# Patient Record
Sex: FEMALE | URBAN - METROPOLITAN AREA
[De-identification: names, ages, dates, MRNs, and addresses within clinical notes are randomized per-mention and may not be internally consistent; named-entity substitution may affect disease eponyms.]

---

## 2022-03-04 ENCOUNTER — APPOINTMENT (RX ONLY)
Dept: URBAN - METROPOLITAN AREA TELEMEDICINE 2 | Facility: TELEMEDICINE | Age: 37
Setting detail: DERMATOLOGY
End: 2022-03-04

## 2022-03-04 DIAGNOSIS — Z41.9 ENCOUNTER FOR PROCEDURE FOR PURPOSES OTHER THAN REMEDYING HEALTH STATE, UNSPECIFIED: ICD-10-CM

## 2022-03-04 PROCEDURE — ? OTHER

## 2022-03-04 PROCEDURE — ? COSMETIC CONSULTATION: BOTULINUM TOXIN

## 2022-03-04 PROCEDURE — ? BOTOX

## 2022-03-04 NOTE — PROCEDURE: BOTOX
Show Masseter Units: Yes
Mentalis Units: 0
Show Right And Left Periorbital Units: No
Post-Care Instructions: Patient instructed to not lie down for 4 hours and limit physical activity for 24 hours. Patient instructed not to travel by airplane for 48 hours.
Additional Area 2 Location: Lower lids
Detail Level: Detailed
Lot #: [id number]
Expiration Date (Month Year): 4/30/2024
Forehead Units: 10
Price (Use Numbers Only, No Special Characters Or $): 390
Dilution (U/0.1 Cc): 5
Additional Area 1 Location: Brows
Glabellar Complex Units: 20
Consent: Written consent obtained. Risks include but not limited to lid/brow ptosis, bruising, swelling, diplopia, temporary effect, incomplete chemical denervation.
Reconstitution Date (Optional): 3/4/2022

## 2022-06-13 ENCOUNTER — APPOINTMENT (RX ONLY)
Dept: URBAN - METROPOLITAN AREA TELEMEDICINE 2 | Facility: TELEMEDICINE | Age: 37
Setting detail: DERMATOLOGY
End: 2022-06-13

## 2022-06-13 DIAGNOSIS — Z41.9 ENCOUNTER FOR PROCEDURE FOR PURPOSES OTHER THAN REMEDYING HEALTH STATE, UNSPECIFIED: ICD-10-CM

## 2022-06-13 PROCEDURE — ? COSMETIC CONSULTATION: BOTULINUM TOXIN

## 2022-06-13 PROCEDURE — ? COSMETIC CONSULTATION: FILLERS

## 2022-06-13 PROCEDURE — ? OTHER

## 2022-06-13 PROCEDURE — ? RECOMMENDATIONS

## 2022-06-13 PROCEDURE — ? BOTOX

## 2022-06-13 NOTE — PROCEDURE: BOTOX
Show Masseter Units: Yes
Mentalis Units: 0
Show Right And Left Periorbital Units: No
Post-Care Instructions: Patient instructed to not lie down for 4 hours and limit physical activity for 24 hours. Patient instructed not to travel by airplane for 48 hours.
Additional Area 2 Location: Lower lids
Detail Level: Detailed
Lot #: [id number]
Expiration Date (Month Year): 10/31/2023
Forehead Units: 10
Price (Use Numbers Only, No Special Characters Or $): 390
Dilution (U/0.1 Cc): 5
Additional Area 1 Location: Brows
Glabellar Complex Units: 20
Consent: Written consent obtained. Risks include but not limited to lid/brow ptosis, bruising, swelling, diplopia, temporary effect, incomplete chemical denervation.
Reconstitution Date (Optional): 06/06/2022

## 2022-06-13 NOTE — PROCEDURE: RECOMMENDATIONS
Detail Level: Simple
Render Risk Assessment In Note?: no
Recommendation Preamble: The following recommendations were made during the visit:
Recommendations (Free Text): Recommend 2 syringes of Lyft vs 2 syringes of voluma to cheeks

## 2022-08-15 NOTE — HPI: COSMETIC CONSULTATION
Head, normocephalic, atraumatic, Face, Face within normal limits, Ears, External ears within normal limits When Outside In The Sun, Do You...: rarely burns, mostly tans

## 2023-02-20 ENCOUNTER — APPOINTMENT (RX ONLY)
Dept: URBAN - METROPOLITAN AREA TELEMEDICINE 2 | Facility: TELEMEDICINE | Age: 38
Setting detail: DERMATOLOGY
End: 2023-02-20

## 2023-02-20 DIAGNOSIS — Z41.9 ENCOUNTER FOR PROCEDURE FOR PURPOSES OTHER THAN REMEDYING HEALTH STATE, UNSPECIFIED: ICD-10-CM

## 2023-02-20 PROCEDURE — ? BOTOX

## 2023-02-20 PROCEDURE — ? COSMETIC CONSULTATION: BOTULINUM TOXIN

## 2023-02-20 PROCEDURE — ? OTHER

## 2023-02-20 NOTE — PROCEDURE: BOTOX
Show Masseter Units: Yes
Mentalis Units: 0
Show Right And Left Periorbital Units: No
Post-Care Instructions: Patient instructed to not lie down for 4 hours and limit physical activity for 24 hours. Patient instructed not to travel by airplane for 48 hours.
Additional Area 2 Location: Lower lids
Detail Level: Detailed
Lot #: [id number]
Expiration Date (Month Year): 4/30/2025
Forehead Units: 10
Price (Use Numbers Only, No Special Characters Or $): 420
Dilution (U/0.1 Cc): 5
Additional Area 1 Location: Brows
Glabellar Complex Units: 20
Consent: Written consent obtained. Risks include but not limited to lid/brow ptosis, bruising, swelling, diplopia, temporary effect, incomplete chemical denervation.
Reconstitution Date (Optional): 2/20/2023

## 2023-05-15 ENCOUNTER — APPOINTMENT (RX ONLY)
Dept: URBAN - METROPOLITAN AREA TELEMEDICINE 2 | Facility: TELEMEDICINE | Age: 38
Setting detail: DERMATOLOGY
End: 2023-05-15

## 2023-05-15 DIAGNOSIS — Z41.9 ENCOUNTER FOR PROCEDURE FOR PURPOSES OTHER THAN REMEDYING HEALTH STATE, UNSPECIFIED: ICD-10-CM

## 2023-05-15 PROCEDURE — ? OTHER

## 2023-05-15 PROCEDURE — ? COSMETIC CONSULTATION: BOTULINUM TOXIN

## 2023-05-15 PROCEDURE — ? BOTOX

## 2023-05-15 NOTE — PROCEDURE: BOTOX
Show Masseter Units: Yes
Mentalis Units: 0
Show Right And Left Periorbital Units: No
Post-Care Instructions: Patient instructed to not lie down for 4 hours and limit physical activity for 24 hours. Patient instructed not to travel by airplane for 48 hours.
Additional Area 2 Location: Lower lids
Detail Level: Detailed
Lot #: [id number]
Expiration Date (Month Year): 4/30/2025
Forehead Units: 10
Price (Use Numbers Only, No Special Characters Or $): 420
Dilution (U/0.1 Cc): 5
Additional Area 1 Location: Brows
Glabellar Complex Units: 20
Consent: Written consent obtained. Risks include but not limited to lid/brow ptosis, bruising, swelling, diplopia, temporary effect, incomplete chemical denervation.
Reconstitution Date (Optional): 5/15/23

## 2023-09-08 ENCOUNTER — APPOINTMENT (RX ONLY)
Dept: URBAN - METROPOLITAN AREA TELEMEDICINE 2 | Facility: TELEMEDICINE | Age: 38
Setting detail: DERMATOLOGY
End: 2023-09-08

## 2023-09-08 DIAGNOSIS — Z41.9 ENCOUNTER FOR PROCEDURE FOR PURPOSES OTHER THAN REMEDYING HEALTH STATE, UNSPECIFIED: ICD-10-CM

## 2023-09-08 PROCEDURE — ? OTHER

## 2023-09-08 PROCEDURE — ? COSMETIC CONSULTATION: BOTULINUM TOXIN

## 2023-09-08 PROCEDURE — ? BOTOX

## 2023-09-08 NOTE — PROCEDURE: BOTOX
Show Masseter Units: Yes
Mentalis Units: 0
Show Right And Left Periorbital Units: No
Post-Care Instructions: Patient instructed to not lie down for 4 hours and limit physical activity for 24 hours. Patient instructed not to travel by airplane for 48 hours.
Additional Area 2 Location: Lower lids
Detail Level: Detailed
Lot #: [id number]
Expiration Date (Month Year): 10/31/25
Forehead Units: 10
Price (Use Numbers Only, No Special Characters Or $): 420
Dilution (U/0.1 Cc): 5
Additional Area 1 Location: Brows
Glabellar Complex Units: 20
Consent: Written consent obtained. Risks include but not limited to lid/brow ptosis, bruising, swelling, diplopia, temporary effect, incomplete chemical denervation.
Reconstitution Date (Optional): 9/8/23

## 2024-02-19 ENCOUNTER — APPOINTMENT (RX ONLY)
Dept: URBAN - METROPOLITAN AREA TELEMEDICINE 2 | Facility: TELEMEDICINE | Age: 39
Setting detail: DERMATOLOGY
End: 2024-02-19

## 2024-02-19 DIAGNOSIS — Z41.9 ENCOUNTER FOR PROCEDURE FOR PURPOSES OTHER THAN REMEDYING HEALTH STATE, UNSPECIFIED: ICD-10-CM

## 2024-02-19 PROCEDURE — ? FILLERS

## 2024-02-19 PROCEDURE — ? RECOMMENDATIONS

## 2024-02-19 PROCEDURE — ? BOTOX

## 2024-02-19 PROCEDURE — ? COSMETIC CONSULTATION: BOTULINUM TOXIN

## 2024-02-19 PROCEDURE — ? OTHER

## 2024-02-19 PROCEDURE — ? COSMETIC CONSULTATION: FILLERS

## 2024-02-19 NOTE — PROCEDURE: RECOMMENDATIONS
Recommendation Preamble: The following recommendations were made during the visit:
Recommendations (Free Text): 1 syringe of Kysse to lips
Detail Level: Simple
Render Risk Assessment In Note?: no

## 2024-02-19 NOTE — PROCEDURE: BOTOX
Show Masseter Units: Yes
Mentalis Units: 0
Show Right And Left Periorbital Units: No
Post-Care Instructions: Patient instructed to not lie down for 4 hours and limit physical activity for 24 hours. Patient instructed not to travel by airplane for 48 hours.
Additional Area 2 Location: Lower lids
Detail Level: Detailed
Lot #: [id number]
Expiration Date (Month Year): 12/31/25
Forehead Units: 10
Price (Use Numbers Only, No Special Characters Or $): 420
Dilution (U/0.1 Cc): 5
Additional Area 1 Location: Brows
Glabellar Complex Units: 20
Consent: Written consent obtained. Risks include but not limited to lid/brow ptosis, bruising, swelling, diplopia, temporary effect, incomplete chemical denervation.
Reconstitution Date (Optional): 2/16/2024

## 2024-02-19 NOTE — PROCEDURE: FILLERS
Brows Filler Volume In Cc: 0
Price (Use Numbers Only, No Special Characters Or $): 2508
Use Map Statement For Sites (Optional): No
Filler: Juvederm Voluma XC
Expiration Date (Month Year): 12/31/2025, 2/28/2026
Cheeks Filler Volume In Cc: 1
Map Statment: See Attach Map for Complete Details
Include Documentation That Aspiration Was Performed Prior To Injecting Filler:: Yes
Aspiration Statement: Aspiration was performed prior to injecting site with filler.
Filler: Marcela Gipson
Additional Area 1 Location: chin
Lot #: [id number]
Additional Area 1 Volume In Cc: 0.2
Expiration Date (Month Year): 9/26/2024
Marionette Lines Filler Volume In Cc: 0.8
Consent: Written consent obtained. Risks include but not limited to bruising, beading, irregular texture, ulceration, infection, allergic reaction, scar formation, incomplete augmentation, temporary nature, procedural pain.
Post-Care Instructions: Patient instructed to apply ice to reduce swelling.
Topical Anesthesia?: BLT cream (benzocaine 20%, lidocaine 10%, tetracaine 10%)
Detail Level: Detailed
Lot #: [id number]

## 2024-03-29 ENCOUNTER — APPOINTMENT (RX ONLY)
Dept: URBAN - METROPOLITAN AREA TELEMEDICINE 2 | Facility: TELEMEDICINE | Age: 39
Setting detail: DERMATOLOGY
End: 2024-03-29

## 2024-03-29 DIAGNOSIS — Z41.9 ENCOUNTER FOR PROCEDURE FOR PURPOSES OTHER THAN REMEDYING HEALTH STATE, UNSPECIFIED: ICD-10-CM

## 2024-03-29 PROCEDURE — ? COSMETIC CONSULTATION: BOTULINUM TOXIN

## 2024-03-29 PROCEDURE — ? FILLERS

## 2024-03-29 PROCEDURE — ? OTHER

## 2024-03-29 PROCEDURE — ? COSMETIC CONSULTATION: FILLERS

## 2024-03-29 PROCEDURE — ? BOTOX

## 2024-03-29 NOTE — PROCEDURE: FILLERS
Appt offered for Monday 6/26 at [address] pm with Dr. Lara. Patient was appreciative no further questions.   
Tear Troughs Filler Volume In Cc: 0
Include Documentation That Aspiration Was Performed Prior To Injecting Filler:: Yes
Jawline Filler Volume In Cc: 0.2
Filler: Restylane Kysse
Include Cannula Information In Note?: No
Aspiration Statement: Aspiration was performed prior to injecting site with filler.
Lot #: [id number]
Expiration Date (Month Year): 9/26/24
Anesthesia Type: 1% lidocaine with epinephrine
Lot #: [id number]
Vermilion Lips Filler Volume In Cc: 1
Anesthesia Volume In Cc: 0.5
Expiration Date (Month Year): 4/30/24
Additional Anesthesia Volume In Cc: 6
Topical Anesthesia?: BLT cream (benzocaine 20%, lidocaine 10%, tetracaine 10%)
Additional Area 1 Location: chin hollow
Additional Area 1 Volume In Cc: 0.6
Detail Level: Detailed
Price (Use Numbers Only, No Special Characters Or $): 8756
Consent: Written consent obtained. Risks include but not limited to bruising, beading, irregular texture, ulceration, infection, allergic reaction, scar formation, incomplete augmentation, temporary nature, procedural pain.
Filler: Juvederm Voluma XC
Post-Care Instructions: Patient instructed to apply ice to reduce swelling.
Map Statment: See Attach Map for Complete Details

## 2024-03-29 NOTE — PROCEDURE: BOTOX
Glabellar Complex Units: 0
Incrementing Botox Units: By 0.5 Units
Show Masseter Units: Yes
Show Right And Left Pupillary Line Units: No
Expiration Date (Month Year): 3/31/26
Price (Use Numbers Only, No Special Characters Or $): 073
Detail Level: Detailed
Additional Area 2 Location: Lower lids
Consent: Written consent obtained. Risks include but not limited to lid/brow ptosis, bruising, swelling, diplopia, temporary effect, incomplete chemical denervation.
Post-Care Instructions: Patient instructed to not lie down for 4 hours and limit physical activity for 24 hours. Patient instructed not to travel by airplane for 48 hours.
Additional Area 2 Units: 2
Lot #: [id number]
Additional Area 1 Location: Brows
Periorbital Skin Units: 14
Dilution (U/0.1 Cc): 5
Reconstitution Date (Optional): 3/27/24

## 2024-06-19 ENCOUNTER — APPOINTMENT (RX ONLY)
Dept: URBAN - METROPOLITAN AREA TELEMEDICINE 2 | Facility: TELEMEDICINE | Age: 39
Setting detail: DERMATOLOGY
End: 2024-06-19

## 2024-06-19 DIAGNOSIS — Z41.9 ENCOUNTER FOR PROCEDURE FOR PURPOSES OTHER THAN REMEDYING HEALTH STATE, UNSPECIFIED: ICD-10-CM

## 2024-06-19 PROCEDURE — ? OTHER

## 2024-06-19 PROCEDURE — ? COSMETIC CONSULTATION: BOTULINUM TOXIN

## 2024-06-19 PROCEDURE — ? FILLERS

## 2024-06-19 PROCEDURE — ? BOTOX

## 2024-06-19 PROCEDURE — ? COSMETIC CONSULTATION: FILLERS

## 2024-06-19 NOTE — PROCEDURE: BOTOX
Glabellar Complex Units: 0
Incrementing Botox Units: By 0.5 Units
Show Masseter Units: Yes
Show Right And Left Pupillary Line Units: No
Additional Area 3 Location: upper lip
Additional Area 3 Units: 4
Expiration Date (Month Year): 3/31/26; 4/30/26
Forehead Units: 10
Price (Use Numbers Only, No Special Characters Or $): 761
Glabellar Complex Units: 20
Detail Level: Detailed
Additional Area 2 Location: Lower lids
Consent: Written consent obtained. Risks include but not limited to lid/brow ptosis, bruising, swelling, diplopia, temporary effect, incomplete chemical denervation.
Post-Care Instructions: Patient instructed to not lie down for 4 hours and limit physical activity for 24 hours. Patient instructed not to travel by airplane for 48 hours.
Additional Area 2 Units: 2
Lot #: [id number]
Additional Area 1 Location: Brows
Dilution (U/0.1 Cc): 5
Reconstitution Date (Optional): 6/19/24

## 2024-06-19 NOTE — PROCEDURE: FILLERS
Include Cannula Information In Note?: No
Vermilion Lips Filler Volume In Cc: 0
Marionette Lines Filler Volume In Cc: 1
Expiration Date (Month Year): 9/26/24
Topical Anesthesia?: BLT cream (benzocaine 20%, lidocaine 10%, tetracaine 10%)
Detail Level: Detailed
Additional Anesthesia Volume In Cc: 6
Price (Use Numbers Only, No Special Characters Or $): 5335
Filler: Restylane Kysse
Include Documentation That Aspiration Was Performed Prior To Injecting Filler:: Yes
Map Statment: See Attach Map for Complete Details
Aspiration Statement: Aspiration was performed prior to injecting site with filler.
Vermilion Lips Filler Volume In Cc: 0.8
Nasolabial Folds Filler Volume In Cc: 0.2
Consent: Written consent obtained. Risks include but not limited to bruising, beading, irregular texture, ulceration, infection, allergic reaction, scar formation, incomplete augmentation, temporary nature, procedural pain.
Lot #: [id number]
Anesthesia Type: 1% lidocaine with epinephrine
Post-Care Instructions: Patient instructed to apply ice to reduce swelling.
Filler: Juvederm Ultra Plus
Expiration Date (Month Year): 6/30/2025
Anesthesia Volume In Cc: 0.5
Lot #: [id number]